# Patient Record
Sex: MALE | Race: WHITE | NOT HISPANIC OR LATINO | URBAN - METROPOLITAN AREA
[De-identification: names, ages, dates, MRNs, and addresses within clinical notes are randomized per-mention and may not be internally consistent; named-entity substitution may affect disease eponyms.]

---

## 2024-10-29 ENCOUNTER — APPOINTMENT (OUTPATIENT)
Dept: OTOLARYNGOLOGY | Facility: CLINIC | Age: 64
End: 2024-10-29
Payer: COMMERCIAL

## 2024-10-29 VITALS — WEIGHT: 188 LBS

## 2024-10-29 DIAGNOSIS — H91.90 HEARING LOSS, UNSPECIFIED HEARING LOSS TYPE, UNSPECIFIED LATERALITY: ICD-10-CM

## 2024-10-29 DIAGNOSIS — H93.13 TINNITUS OF BOTH EARS: ICD-10-CM

## 2024-10-29 DIAGNOSIS — Z86.69 HISTORY OF PERFORATION OF TYMPANIC MEMBRANE: Primary | ICD-10-CM

## 2024-10-29 PROCEDURE — 99203 OFFICE O/P NEW LOW 30 MIN: CPT | Performed by: GENERAL PRACTICE

## 2024-11-25 ENCOUNTER — APPOINTMENT (OUTPATIENT)
Dept: AUDIOLOGY | Facility: CLINIC | Age: 64
End: 2024-11-25
Payer: COMMERCIAL

## 2024-12-19 ENCOUNTER — APPOINTMENT (OUTPATIENT)
Dept: AUDIOLOGY | Facility: CLINIC | Age: 64
End: 2024-12-19
Payer: COMMERCIAL

## 2024-12-19 DIAGNOSIS — H93.11 TINNITUS OF RIGHT EAR: ICD-10-CM

## 2024-12-19 DIAGNOSIS — H90.3 SENSORINEURAL HEARING LOSS (SNHL) OF BOTH EARS: Primary | ICD-10-CM

## 2024-12-19 DIAGNOSIS — H90.3 ASYMMETRICAL SENSORINEURAL HEARING LOSS: ICD-10-CM

## 2024-12-19 DIAGNOSIS — H91.90 HEARING LOSS, UNSPECIFIED HEARING LOSS TYPE, UNSPECIFIED LATERALITY: ICD-10-CM

## 2024-12-19 DIAGNOSIS — H69.91 DYSFUNCTION OF RIGHT EUSTACHIAN TUBE: ICD-10-CM

## 2024-12-19 PROCEDURE — 92557 COMPREHENSIVE HEARING TEST: CPT | Performed by: AUDIOLOGIST

## 2024-12-19 PROCEDURE — 92550 TYMPANOMETRY & REFLEX THRESH: CPT | Performed by: AUDIOLOGIST

## 2024-12-19 NOTE — PROGRESS NOTES
AUDIOLOGY ADULT AUDIOMETRIC EVALUATION    Name:  Cliff Canales  :  1960  Age:  64 y.o.  Date of Evaluation:  2024    Reason for visit: Mr. Canales is seen in the clinic today at the request of otolaryngology for an audiologic evaluation.     HISTORY  Patient complains of long term right worse than left hearing loss and notices right tinnitus louder than left.  He denies dizziness and aural fullness.  He reports two sets of tubes about twenty years ago and has occupational noise exposure but wears HPD's in noise when possible.      EVALUATION  See scanned audiogram: “Media” > “Audiology Report”.      RESULTS  Otoscopic Evaluation:  Right Ear: clear ear canal  Left Ear: clear ear canal    Immittance Measures:  Tympanometry:  Right Ear:  Type Ad, hypercompliant tympanic membrane mobility with normal middle ear pressure   Left Ear: Type A, normal tympanic membrane mobility with normal middle ear pressure     Acoustic Reflexes:  Ipsilateral Right Ear:  NR NR NR NR ( .5-4 K HZ)  Ipsilateral Left Ear:     100 100 (  .5- 4 K HZ )  Contralateral Right Ear: did not evaluate  Contralateral Left Ear: did not evaluate    Distortion Product Otoacoustic Emissions (DPOAEs):  Right Ear:  PASS: NONE   REFER: 1-8 K HZ.  Left Ear:    PASS: 4 K HZ   REFER: 1, 1.5, 2,3,4,6,8 K HZ.    Audiometry:  Test Technique and Reliability: BEHAVIORAL  Standard audiometry via supra-aural headphones/INSERTS . Reliability is good.    Pure tone air and bone conduction audiometry:  Right Ear:  MOSTLY MILDTO 2 K HZ WITH A MODERATE TO SEVERE SNHL DROP AT 3 - 8 K HZ.  ASYMMETRY NOTED FROM LEFT EAR.  Left Ear: MOSTLY MILD SNHL 500- 8 K HZ.     Speech Audiometry (Word Recognition Scores):   Right Ear:  92% GOOD  Left Ear:    96% GOOD    IMPRESSIONS  ASYMMETRIC LONG TERM RIGHT SNHL WITH RIGHT WORSE THAN LEFT TINNITUS, LEFT MILD SNHL.  The presence of acoustic reflexes within normal intensity limits is consistent with normal middle ear  and brainstem function, and suggests that auditory sensitivity is not significantly impaired. An elevated or absent acoustic reflex threshold is consistent with a middle ear disorder, hearing loss in the stimulated ear, and/or interruption of neural innervation of the stapedius muscle. Present DPOAEs suggest normal/near normal cochlear outer hair cell function and are consistent with no greater than a mild hearing loss at those frequencies. Absent DPOAEs are consistent with abnormal cochlear outer hair cell function and some degree of hearing loss at those frequencies.    RECOMMENDATIONS  - Follow up with otolaryngology  as scheduled. Special tests as needed for asymmetry, right tinnitus.  - Audiologic evaluation as needed.  - Annual audiologic evaluation, sooner if an acute change is noted.  - Audiologic evaluation in conjunction with otologic care, if an acute change is noted, and/or annually.  - Consider hearing aids. Discussed , after medical consult if desires  to wear hearing aids. Contact insurance to determine if there is an applicable benefit and where it can be used. Contact our office to schedule an appointment should he wish to proceed with hearing aids through our clinic.  - Continue with Hearing Protection Devices in noise.  - Follow-up with medical care team as planned.    PATIENT EDUCATION  Discussed results, impressions and recommendations with the patient. Questions were addressed and the patient was encouraged to contact our office should concerns arise.    Time for this encounter: 60 minutes     Radha Pinon  Licensed Audiologist

## 2025-02-13 ENCOUNTER — OFFICE VISIT (OUTPATIENT)
Dept: PRIMARY CARE | Facility: CLINIC | Age: 65
End: 2025-02-13
Payer: COMMERCIAL

## 2025-02-13 VITALS
SYSTOLIC BLOOD PRESSURE: 160 MMHG | HEIGHT: 68 IN | OXYGEN SATURATION: 98 % | BODY MASS INDEX: 28.79 KG/M2 | DIASTOLIC BLOOD PRESSURE: 94 MMHG | HEART RATE: 101 BPM | WEIGHT: 190 LBS

## 2025-02-13 DIAGNOSIS — Z00.00 ROUTINE GENERAL MEDICAL EXAMINATION AT A HEALTH CARE FACILITY: ICD-10-CM

## 2025-02-13 DIAGNOSIS — R20.2 PARESTHESIA OF RIGHT ARM: ICD-10-CM

## 2025-02-13 DIAGNOSIS — Z12.12 ENCOUNTER FOR COLORECTAL CANCER SCREENING: ICD-10-CM

## 2025-02-13 DIAGNOSIS — Z12.11 ENCOUNTER FOR COLORECTAL CANCER SCREENING: ICD-10-CM

## 2025-02-13 DIAGNOSIS — I10 ESSENTIAL HYPERTENSION, BENIGN: Primary | ICD-10-CM

## 2025-02-13 DIAGNOSIS — Z23 IMMUNIZATION DUE: ICD-10-CM

## 2025-02-13 DIAGNOSIS — Z13.220 LIPID SCREENING: ICD-10-CM

## 2025-02-13 PROCEDURE — 3079F DIAST BP 80-89 MM HG: CPT | Performed by: STUDENT IN AN ORGANIZED HEALTH CARE EDUCATION/TRAINING PROGRAM

## 2025-02-13 PROCEDURE — 99386 PREV VISIT NEW AGE 40-64: CPT | Performed by: STUDENT IN AN ORGANIZED HEALTH CARE EDUCATION/TRAINING PROGRAM

## 2025-02-13 PROCEDURE — 90715 TDAP VACCINE 7 YRS/> IM: CPT | Performed by: STUDENT IN AN ORGANIZED HEALTH CARE EDUCATION/TRAINING PROGRAM

## 2025-02-13 PROCEDURE — 90471 IMMUNIZATION ADMIN: CPT | Performed by: STUDENT IN AN ORGANIZED HEALTH CARE EDUCATION/TRAINING PROGRAM

## 2025-02-13 PROCEDURE — 3077F SYST BP >= 140 MM HG: CPT | Performed by: STUDENT IN AN ORGANIZED HEALTH CARE EDUCATION/TRAINING PROGRAM

## 2025-02-13 PROCEDURE — 3008F BODY MASS INDEX DOCD: CPT | Performed by: STUDENT IN AN ORGANIZED HEALTH CARE EDUCATION/TRAINING PROGRAM

## 2025-02-13 RX ORDER — OLMESARTAN MEDOXOMIL 20 MG/1
20 TABLET ORAL DAILY
Qty: 30 TABLET | Refills: 1 | Status: SHIPPED | OUTPATIENT
Start: 2025-02-13 | End: 2025-04-14

## 2025-02-13 ASSESSMENT — ENCOUNTER SYMPTOMS
COUGH: 0
ARTHRALGIAS: 1
NAUSEA: 0
DIZZINESS: 0
CONSTIPATION: 0
NUMBNESS: 1
WHEEZING: 0
LIGHT-HEADEDNESS: 0
HEADACHES: 0
RHINORRHEA: 0
WOUND: 0
NERVOUS/ANXIOUS: 0
CHILLS: 0
DYSPHORIC MOOD: 0
FEVER: 0
FREQUENCY: 0
SINUS PAIN: 0
DYSURIA: 0
DIARRHEA: 0
SINUS PRESSURE: 0
PALPITATIONS: 0
FATIGUE: 0
POLYDIPSIA: 0
SLEEP DISTURBANCE: 0
MYALGIAS: 1
VOMITING: 0
SHORTNESS OF BREATH: 0

## 2025-02-13 ASSESSMENT — PATIENT HEALTH QUESTIONNAIRE - PHQ9
SUM OF ALL RESPONSES TO PHQ9 QUESTIONS 1 AND 2: 0
1. LITTLE INTEREST OR PLEASURE IN DOING THINGS: NOT AT ALL
2. FEELING DOWN, DEPRESSED OR HOPELESS: NOT AT ALL

## 2025-02-13 NOTE — PROGRESS NOTES
"Subjective   Patient ID: Cliff Canales is a 65 y.o. male who presents for Hypertension.    Here today to establish care.      Has known history of HTN.  Checked at work health fair, was in the 130's systolic.  Checking at home and in 150's.      Has had some right shoulder pain.  Will get some tingling and burning into the shoulder when driving long distances.    Patient is up-to-date on routine dental and vision care.  He does get regular physical activity.  States he does not exercise much in the winter due to most of his preferred activities being outdoors.        Review of Systems   Constitutional:  Negative for chills, fatigue and fever.   HENT:  Negative for congestion, hearing loss, rhinorrhea, sinus pressure, sinus pain and tinnitus.    Eyes:  Negative for visual disturbance.   Respiratory:  Negative for cough, shortness of breath and wheezing.    Cardiovascular:  Negative for chest pain, palpitations and leg swelling.   Gastrointestinal:  Negative for constipation, diarrhea, nausea and vomiting.   Endocrine: Negative for cold intolerance, heat intolerance, polydipsia and polyuria.   Genitourinary:  Negative for dysuria, frequency and urgency.   Musculoskeletal:  Positive for arthralgias and myalgias.   Skin:  Negative for pallor, rash and wound.   Neurological:  Positive for numbness. Negative for dizziness, light-headedness and headaches.   Psychiatric/Behavioral:  Negative for dysphoric mood and sleep disturbance. The patient is not nervous/anxious.        Objective     Visit Vitals  BP (!) 160/94   Pulse 101   Ht 1.727 m (5' 8\")   Wt 86.2 kg (190 lb)   SpO2 98%   BMI 28.89 kg/m²   Smoking Status Former   BSA 2.03 m²         Physical Exam  Constitutional:       Appearance: Normal appearance.   HENT:      Head: Normocephalic and atraumatic.      Right Ear: Tympanic membrane, ear canal and external ear normal.      Left Ear: Tympanic membrane, ear canal and external ear normal.      Nose: Nose normal.      " Mouth/Throat:      Mouth: Mucous membranes are moist.      Pharynx: Oropharynx is clear.   Eyes:      Extraocular Movements: Extraocular movements intact.      Conjunctiva/sclera: Conjunctivae normal.      Pupils: Pupils are equal, round, and reactive to light.   Cardiovascular:      Rate and Rhythm: Normal rate and regular rhythm.      Pulses: Normal pulses.      Heart sounds: Normal heart sounds.   Pulmonary:      Effort: Pulmonary effort is normal.      Breath sounds: Normal breath sounds.   Abdominal:      General: There is no distension.      Palpations: Abdomen is soft.      Tenderness: There is no abdominal tenderness.   Musculoskeletal:         General: Normal range of motion.   Skin:     General: Skin is warm and dry.   Neurological:      Mental Status: He is alert and oriented to person, place, and time. Mental status is at baseline.   Psychiatric:         Mood and Affect: Mood normal.         Behavior: Behavior normal.         Assessment & Plan  Essential hypertension, benign    Orders:    Comprehensive metabolic panel; Future    CBC; Future    Albumin-Creatinine Ratio, Urine Random; Future    olmesartan (BENIcar) 20 mg tablet; Take 1 tablet (20 mg) by mouth once daily.  Uncontrolled.  Not currently on any medication.  Previously took losartan without adverse effects.  To start olmesartan 20 mg daily.  If blood pressure remains elevated in 1 week, increase to 40 mg daily and contact the office for a refill at a higher dose.  Lipid screening    Orders:    Lipid panel; Future    Immunization due    Orders:    Tdap vaccine, age 7 years and older  (BOOSTRIX)    Encounter for colorectal cancer screening    Orders:    Colonoscopy Screening; Average Risk Patient; Future    Paresthesia of right arm    Orders:    XR shoulder right 2+ views; Future    XR cervical spine complete 4-5 views; Future  Findings consistent with impingement somewhere along C5/C6 distribution.  Unclear if this would be more centrally near  the spinal cord or representing a focal brachial plexopathy.  Will obtain plain films of the shoulder and C-spine to initiate workup.  Routine general medical examination at a health care facility       Due for colonoscopy.  Reports PSA was drawn recently at outside facility and he will provide results to office once available.  Patient has history of smoking for many years, however about 1/4 pack/day so does not qualify for lung screening.  Immunizations reviewed and updated today.  Reviewed basics of healthy diet and exercise.  Room to improve with regard to both.  Patient meets physical activity recommendations during the summer, but not during the winter.     Reviewed and approved by ROBY PROCTOR on 2/13/25 at 7:05 PM.

## 2025-02-13 NOTE — ASSESSMENT & PLAN NOTE
Orders:    Comprehensive metabolic panel; Future    CBC; Future    Albumin-Creatinine Ratio, Urine Random; Future    olmesartan (BENIcar) 20 mg tablet; Take 1 tablet (20 mg) by mouth once daily.  Uncontrolled.  Not currently on any medication.  Previously took losartan without adverse effects.  To start olmesartan 20 mg daily.  If blood pressure remains elevated in 1 week, increase to 40 mg daily and contact the office for a refill at a higher dose.

## 2025-02-13 NOTE — PATIENT INSTRUCTIONS
Start olmesartan 20 mg daily.  If blood pressure stays above 140 for the top number or above 90 for the bottom number, increase to 40 mg after 1 week and call the office so I can change your prescription.

## 2025-03-01 LAB
ALBUMIN SERPL-MCNC: 4.6 G/DL (ref 3.6–5.1)
ALBUMIN/CREAT UR: 9 MG/G CREAT
ALP SERPL-CCNC: 49 U/L (ref 35–144)
ALT SERPL-CCNC: 32 U/L (ref 9–46)
ANION GAP SERPL CALCULATED.4IONS-SCNC: 7 MMOL/L (CALC) (ref 7–17)
AST SERPL-CCNC: 20 U/L (ref 10–35)
BILIRUB SERPL-MCNC: 1.2 MG/DL (ref 0.2–1.2)
BUN SERPL-MCNC: 16 MG/DL (ref 7–25)
CALCIUM SERPL-MCNC: 9.5 MG/DL (ref 8.6–10.3)
CHLORIDE SERPL-SCNC: 104 MMOL/L (ref 98–110)
CHOLEST SERPL-MCNC: 216 MG/DL
CHOLEST/HDLC SERPL: 3.8 (CALC)
CO2 SERPL-SCNC: 30 MMOL/L (ref 20–32)
CREAT SERPL-MCNC: 0.87 MG/DL (ref 0.7–1.35)
CREAT UR-MCNC: 123 MG/DL (ref 20–320)
EGFRCR SERPLBLD CKD-EPI 2021: 96 ML/MIN/1.73M2
ERYTHROCYTE [DISTWIDTH] IN BLOOD BY AUTOMATED COUNT: 12.9 % (ref 11–15)
GLUCOSE SERPL-MCNC: 114 MG/DL (ref 65–99)
HCT VFR BLD AUTO: 47.4 % (ref 38.5–50)
HDLC SERPL-MCNC: 57 MG/DL
HGB BLD-MCNC: 15.3 G/DL (ref 13.2–17.1)
LDLC SERPL CALC-MCNC: 133 MG/DL (CALC)
MCH RBC QN AUTO: 30 PG (ref 27–33)
MCHC RBC AUTO-ENTMCNC: 32.3 G/DL (ref 32–36)
MCV RBC AUTO: 92.9 FL (ref 80–100)
MICROALBUMIN UR-MCNC: 1.1 MG/DL
NONHDLC SERPL-MCNC: 159 MG/DL (CALC)
PLATELET # BLD AUTO: 211 THOUSAND/UL (ref 140–400)
PMV BLD REES-ECKER: 12 FL (ref 7.5–12.5)
POTASSIUM SERPL-SCNC: 4.4 MMOL/L (ref 3.5–5.3)
PROT SERPL-MCNC: 6.9 G/DL (ref 6.1–8.1)
RBC # BLD AUTO: 5.1 MILLION/UL (ref 4.2–5.8)
SODIUM SERPL-SCNC: 141 MMOL/L (ref 135–146)
TRIGL SERPL-MCNC: 144 MG/DL
WBC # BLD AUTO: 5.7 THOUSAND/UL (ref 3.8–10.8)

## 2025-03-04 ENCOUNTER — PATIENT MESSAGE (OUTPATIENT)
Dept: PRIMARY CARE | Facility: CLINIC | Age: 65
End: 2025-03-04
Payer: COMMERCIAL

## 2025-03-04 DIAGNOSIS — I10 ESSENTIAL HYPERTENSION, BENIGN: ICD-10-CM

## 2025-03-04 RX ORDER — OLMESARTAN MEDOXOMIL 40 MG/1
40 TABLET ORAL DAILY
Qty: 90 TABLET | Refills: 0 | Status: SHIPPED | OUTPATIENT
Start: 2025-03-04 | End: 2025-06-02

## 2025-03-17 ENCOUNTER — APPOINTMENT (OUTPATIENT)
Dept: OTOLARYNGOLOGY | Facility: CLINIC | Age: 65
End: 2025-03-17
Payer: COMMERCIAL

## 2025-03-17 VITALS — HEIGHT: 68 IN | TEMPERATURE: 97.6 F | WEIGHT: 186 LBS | BODY MASS INDEX: 28.19 KG/M2

## 2025-03-17 DIAGNOSIS — Z00.00 HEALTH CARE MAINTENANCE: ICD-10-CM

## 2025-03-17 DIAGNOSIS — H90.3 BILATERAL SENSORINEURAL HEARING LOSS: ICD-10-CM

## 2025-03-17 DIAGNOSIS — H90.3 ASNHL (ASYMMETRICAL SENSORINEURAL HEARING LOSS): Primary | ICD-10-CM

## 2025-03-17 PROCEDURE — 3008F BODY MASS INDEX DOCD: CPT | Performed by: OTOLARYNGOLOGY

## 2025-03-17 PROCEDURE — 99213 OFFICE O/P EST LOW 20 MIN: CPT | Performed by: OTOLARYNGOLOGY

## 2025-03-17 NOTE — PROGRESS NOTES
"HPI  Cliff Canales is a 65 y.o. male previously evaluated by Dr. Barnard with right greater than left hearing loss.  He had an audiogram done in November which showed right greater than left sensorineural hearing loss particularly in the high frequencies.  Normal tympanometry bilaterally.  Denies otalgia and otorrhea.  He has bilateral tinnitus.  Dr. Barnard had discussed MRI with him but he has not followed up with him.  The patient denies noise exposure.  He has a history of tubes in his 40s.      History reviewed. No pertinent past medical history.         Medications:     Current Outpatient Medications:     olmesartan (BENIcar) 40 mg tablet, Take 1 tablet (40 mg) by mouth once daily., Disp: 90 tablet, Rfl: 0     Allergies:  No Known Allergies     Physical Exam:  Last Recorded Vitals  Temperature 36.4 °C (97.6 °F), height 1.727 m (5' 8\"), weight 84.4 kg (186 lb).  General:     General appearance: Well-developed, well-nourished in no acute distress.       Voice:  normal       Head/face: Normal appearance; nontender to palpation     Facial nerve function: Normal and symmetric bilaterally.    Oral/oropharynx:     Oral vestibule: Normal labial and gingival mucosa     Tongue/floor of mouth: Normal without lesion     Oropharynx: Clear.  No lesions present of the hard/soft palate, posterior pharynx    Neck:     Neck: Normal appearance, trachea midline     Salivary glands: Normal to palpation bilaterally     Lymph nodes: No cervical lymphadenopathy to palpation     Thyroid: No thyromegaly.  No palpable nodules     Range of motion: Normal    Neurological:     Cortical functions: Alert and oriented x3, appropriate affect       Larynx/hypopharynx:     Laryngeal findings: Mirror exam inadequate or limited secondary to enlarged base of tongue and/or excessive gagging    Ear:     Ear canal: Normal bilaterally     Tympanic membrane: Intact and mobile bilaterally.  Atrophic segments bilaterally.  Lateralized on the right.  " Retracted on the left.  No evidence of middle ear effusion     Pinna: Normal bilaterally     Hearing:  Gross hearing assessment normal by voice    Nose:     Visualized using: Anterior rhinoscopy     Nasopharynx: Inadequate mirror exam secondary to gag, anatomy.       Nasal dorsum: Nontraumatic midline appearance     Septum: Midline     Inferior turbinates: Normally sized     Mucosa: Bilateral, pink, normal appearing       ASSESSMENT/PLAN:  Asymmetric sensorineural hearing loss, right greater than left.  We talked about hearing aid evaluation on the right.  I agree with the previous recommendation of MRI and have ordered this for him.  He will call when it is done and recheck audiogram in a year, sooner as needed with any change.  Hearing protection discussed        Arnoldo Fall MD

## 2025-03-20 ENCOUNTER — HOSPITAL ENCOUNTER (OUTPATIENT)
Dept: RADIOLOGY | Facility: HOSPITAL | Age: 65
Discharge: HOME | End: 2025-03-20
Payer: COMMERCIAL

## 2025-03-20 DIAGNOSIS — R20.2 PARESTHESIA OF RIGHT ARM: ICD-10-CM

## 2025-03-20 PROCEDURE — 73030 X-RAY EXAM OF SHOULDER: CPT | Mod: RT

## 2025-03-20 PROCEDURE — 72050 X-RAY EXAM NECK SPINE 4/5VWS: CPT

## 2025-03-27 ENCOUNTER — TELEPHONE (OUTPATIENT)
Dept: PRIMARY CARE | Facility: CLINIC | Age: 65
End: 2025-03-27

## 2025-03-27 ENCOUNTER — APPOINTMENT (OUTPATIENT)
Dept: PRIMARY CARE | Facility: CLINIC | Age: 65
End: 2025-03-27
Payer: COMMERCIAL

## 2025-03-27 VITALS
WEIGHT: 187 LBS | BODY MASS INDEX: 28.43 KG/M2 | OXYGEN SATURATION: 99 % | HEART RATE: 86 BPM | TEMPERATURE: 98 F | SYSTOLIC BLOOD PRESSURE: 144 MMHG | DIASTOLIC BLOOD PRESSURE: 76 MMHG

## 2025-03-27 DIAGNOSIS — M79.671 RIGHT FOOT PAIN: ICD-10-CM

## 2025-03-27 DIAGNOSIS — I10 ESSENTIAL HYPERTENSION, BENIGN: ICD-10-CM

## 2025-03-27 DIAGNOSIS — I10 ESSENTIAL HYPERTENSION, BENIGN: Primary | ICD-10-CM

## 2025-03-27 PROCEDURE — 3077F SYST BP >= 140 MM HG: CPT | Performed by: STUDENT IN AN ORGANIZED HEALTH CARE EDUCATION/TRAINING PROGRAM

## 2025-03-27 PROCEDURE — 3078F DIAST BP <80 MM HG: CPT | Performed by: STUDENT IN AN ORGANIZED HEALTH CARE EDUCATION/TRAINING PROGRAM

## 2025-03-27 PROCEDURE — 99214 OFFICE O/P EST MOD 30 MIN: CPT | Performed by: STUDENT IN AN ORGANIZED HEALTH CARE EDUCATION/TRAINING PROGRAM

## 2025-03-27 RX ORDER — OLMESARTAN MEDOXOMIL AND HYDROCHLOROTHIAZIDE 40/12.5 40; 12.5 MG/1; MG/1
1 TABLET ORAL DAILY
Qty: 30 TABLET | Refills: 2 | Status: SHIPPED | OUTPATIENT
Start: 2025-03-27 | End: 2025-06-25

## 2025-03-27 ASSESSMENT — ENCOUNTER SYMPTOMS
HYPERTENSION: 1
HEADACHES: 0
NECK PAIN: 0
PND: 0
PALPITATIONS: 0
BLURRED VISION: 0
SWEATS: 0
ORTHOPNEA: 0
SHORTNESS OF BREATH: 0

## 2025-03-27 ASSESSMENT — PAIN SCALES - GENERAL: PAINLEVEL_OUTOF10: 0-NO PAIN

## 2025-03-27 NOTE — ASSESSMENT & PLAN NOTE
Orders:    olmesartan-hydrochlorothiazide (Benicar HCT) 40-12.5 mg tablet; Take 1 tablet by mouth once daily.  Improved but not controlled at this time.  Will add small dose of hydrochlorothiazide.  Pharmacy is only been dispensing 30 days at a time rather than 90, so should be okay to switch immediately to combo pill.  Patient is to follow-up in 6 weeks

## 2025-03-27 NOTE — PROGRESS NOTES
Subjective   Patient ID: Cliff Canales is a 65 y.o. male who presents for Hypertension.  Hypertension  This is a chronic problem. The current episode started more than 1 year ago. The problem has been waxing and waning since onset. The problem is resistant. Pertinent negatives include no anxiety, blurred vision, chest pain, headaches, malaise/fatigue, neck pain, orthopnea, palpitations, peripheral edema, PND, shortness of breath or sweats. Risk factors for coronary artery disease include dyslipidemia. Compliance problems include exercise.        Review of Systems   Constitutional:  Negative for malaise/fatigue.   Eyes:  Negative for blurred vision.   Respiratory:  Negative for shortness of breath.    Cardiovascular:  Negative for chest pain, palpitations, orthopnea and PND.   Musculoskeletal:  Positive for arthralgias. Negative for neck pain.   Neurological:  Negative for headaches.       Objective     Visit Vitals  /76   Pulse 86   Temp 36.7 °C (98 °F)   Wt 84.8 kg (187 lb)   SpO2 99%   BMI 28.43 kg/m²   Smoking Status Former   BSA 2.02 m²     BP Readings from Last 5 Encounters:   03/27/25 144/76   02/13/25 (!) 160/94         Physical Exam  Constitutional:       Appearance: Normal appearance.   HENT:      Head: Normocephalic and atraumatic.   Eyes:      Conjunctiva/sclera: Conjunctivae normal.   Cardiovascular:      Rate and Rhythm: Normal rate and regular rhythm.      Pulses: Normal pulses.      Heart sounds: Normal heart sounds.   Pulmonary:      Effort: Pulmonary effort is normal.      Breath sounds: Normal breath sounds.   Abdominal:      General: There is no distension.      Palpations: Abdomen is soft.      Tenderness: There is no abdominal tenderness.   Skin:     General: Skin is warm and dry.   Neurological:      Mental Status: He is alert and oriented to person, place, and time.   Psychiatric:         Mood and Affect: Mood normal.         Behavior: Behavior normal.         Assessment & Plan  Essential  hypertension, benign    Orders:    olmesartan-hydrochlorothiazide (Benicar HCT) 40-12.5 mg tablet; Take 1 tablet by mouth once daily.  Improved but not controlled at this time.  Will add small dose of hydrochlorothiazide.  Pharmacy is only been dispensing 30 days at a time rather than 90, so should be okay to switch immediately to combo pill.  Patient is to follow-up in 6 weeks  Right foot pain    Orders:    XR foot right 3+ views; Future  Patient with symptoms consistent with metatarsalgia.  Apart from tenderness there were no abnormal physical findings on examination of the foot.  Will obtain plain films to rule out bony injuries.  Treatment might include physical therapy, custom orthotics.     Reviewed and approved by ROBY PROCTOR on 3/28/25 at 7:59 AM.

## 2025-03-27 NOTE — TELEPHONE ENCOUNTER
FT - HTN follow-up - 5/28/2025  Please call patient if he needs blood work prior to this appointment.

## 2025-03-28 ENCOUNTER — APPOINTMENT (OUTPATIENT)
Dept: OTOLARYNGOLOGY | Facility: CLINIC | Age: 65
End: 2025-03-28
Payer: COMMERCIAL

## 2025-03-28 ASSESSMENT — ENCOUNTER SYMPTOMS: ARTHRALGIAS: 1

## 2025-04-03 ENCOUNTER — HOSPITAL ENCOUNTER (OUTPATIENT)
Dept: RADIOLOGY | Facility: HOSPITAL | Age: 65
Discharge: HOME | End: 2025-04-03
Payer: COMMERCIAL

## 2025-04-03 DIAGNOSIS — H90.3 ASNHL (ASYMMETRICAL SENSORINEURAL HEARING LOSS): ICD-10-CM

## 2025-04-03 PROCEDURE — 2550000001 HC RX 255 CONTRASTS: Performed by: OTOLARYNGOLOGY

## 2025-04-03 PROCEDURE — A9575 INJ GADOTERATE MEGLUMI 0.1ML: HCPCS | Performed by: OTOLARYNGOLOGY

## 2025-04-03 PROCEDURE — 70553 MRI BRAIN STEM W/O & W/DYE: CPT | Performed by: RADIOLOGY

## 2025-04-03 PROCEDURE — 70553 MRI BRAIN STEM W/O & W/DYE: CPT

## 2025-04-03 RX ORDER — GADOTERATE MEGLUMINE 376.9 MG/ML
20 INJECTION INTRAVENOUS
Status: COMPLETED | OUTPATIENT
Start: 2025-04-03 | End: 2025-04-03

## 2025-04-03 RX ADMIN — GADOTERATE MEGLUMINE 18 ML: 376.9 INJECTION INTRAVENOUS at 15:32

## 2025-04-28 DIAGNOSIS — I10 ESSENTIAL HYPERTENSION, BENIGN: ICD-10-CM

## 2025-05-23 LAB
ALBUMIN SERPL-MCNC: 4.7 G/DL (ref 3.6–5.1)
ALP SERPL-CCNC: 57 U/L (ref 35–144)
ALT SERPL-CCNC: 32 U/L (ref 9–46)
ANION GAP SERPL CALCULATED.4IONS-SCNC: 7 MMOL/L (CALC) (ref 7–17)
AST SERPL-CCNC: 22 U/L (ref 10–35)
BILIRUB SERPL-MCNC: 0.7 MG/DL (ref 0.2–1.2)
BUN SERPL-MCNC: 27 MG/DL (ref 7–25)
CALCIUM SERPL-MCNC: 9.4 MG/DL (ref 8.6–10.3)
CHLORIDE SERPL-SCNC: 103 MMOL/L (ref 98–110)
CO2 SERPL-SCNC: 29 MMOL/L (ref 20–32)
CREAT SERPL-MCNC: 1.14 MG/DL (ref 0.7–1.35)
EGFRCR SERPLBLD CKD-EPI 2021: 72 ML/MIN/1.73M2
GLUCOSE SERPL-MCNC: 92 MG/DL (ref 65–99)
POTASSIUM SERPL-SCNC: 4.2 MMOL/L (ref 3.5–5.3)
PROT SERPL-MCNC: 6.8 G/DL (ref 6.1–8.1)
SODIUM SERPL-SCNC: 139 MMOL/L (ref 135–146)

## 2025-05-27 ASSESSMENT — ENCOUNTER SYMPTOMS
HYPERTENSION: 1
SHORTNESS OF BREATH: 0
PND: 0
NECK PAIN: 0
ORTHOPNEA: 0
SWEATS: 0
BLURRED VISION: 0
PALPITATIONS: 0
HEADACHES: 0

## 2025-05-28 ENCOUNTER — APPOINTMENT (OUTPATIENT)
Dept: PRIMARY CARE | Facility: CLINIC | Age: 65
End: 2025-05-28
Payer: COMMERCIAL

## 2025-05-28 ENCOUNTER — TELEPHONE (OUTPATIENT)
Dept: PRIMARY CARE | Facility: CLINIC | Age: 65
End: 2025-05-28

## 2025-05-28 VITALS
HEART RATE: 88 BPM | WEIGHT: 190 LBS | BODY MASS INDEX: 28.79 KG/M2 | DIASTOLIC BLOOD PRESSURE: 76 MMHG | SYSTOLIC BLOOD PRESSURE: 128 MMHG | OXYGEN SATURATION: 97 % | HEIGHT: 68 IN

## 2025-05-28 DIAGNOSIS — I10 ESSENTIAL HYPERTENSION, BENIGN: ICD-10-CM

## 2025-05-28 DIAGNOSIS — Z12.5 ENCOUNTER FOR PROSTATE CANCER SCREENING: ICD-10-CM

## 2025-05-28 DIAGNOSIS — G57.11 MERALGIA PARESTHETICA OF RIGHT SIDE: Primary | ICD-10-CM

## 2025-05-28 PROCEDURE — 3074F SYST BP LT 130 MM HG: CPT | Performed by: STUDENT IN AN ORGANIZED HEALTH CARE EDUCATION/TRAINING PROGRAM

## 2025-05-28 PROCEDURE — 99213 OFFICE O/P EST LOW 20 MIN: CPT | Performed by: STUDENT IN AN ORGANIZED HEALTH CARE EDUCATION/TRAINING PROGRAM

## 2025-05-28 PROCEDURE — 3008F BODY MASS INDEX DOCD: CPT | Performed by: STUDENT IN AN ORGANIZED HEALTH CARE EDUCATION/TRAINING PROGRAM

## 2025-05-28 PROCEDURE — 3078F DIAST BP <80 MM HG: CPT | Performed by: STUDENT IN AN ORGANIZED HEALTH CARE EDUCATION/TRAINING PROGRAM

## 2025-05-28 RX ORDER — OLMESARTAN MEDOXOMIL AND HYDROCHLOROTHIAZIDE 40/12.5 40; 12.5 MG/1; MG/1
1 TABLET ORAL DAILY
Qty: 90 TABLET | Refills: 3 | Status: SHIPPED | OUTPATIENT
Start: 2025-05-28

## 2025-05-28 ASSESSMENT — PATIENT HEALTH QUESTIONNAIRE - PHQ9
1. LITTLE INTEREST OR PLEASURE IN DOING THINGS: NOT AT ALL
2. FEELING DOWN, DEPRESSED OR HOPELESS: NOT AT ALL
SUM OF ALL RESPONSES TO PHQ9 QUESTIONS 1 AND 2: 0

## 2025-05-28 ASSESSMENT — ENCOUNTER SYMPTOMS
PALPITATIONS: 0
PND: 0
SHORTNESS OF BREATH: 0
SWEATS: 0
HEADACHES: 0
HYPERTENSION: 1
BLURRED VISION: 0
ORTHOPNEA: 0
NECK PAIN: 0

## 2025-05-28 ASSESSMENT — COLUMBIA-SUICIDE SEVERITY RATING SCALE - C-SSRS: 1. IN THE PAST MONTH, HAVE YOU WISHED YOU WERE DEAD OR WISHED YOU COULD GO TO SLEEP AND NOT WAKE UP?: NO

## 2025-05-28 ASSESSMENT — PAIN SCALES - GENERAL: PAINLEVEL_OUTOF10: 0-NO PAIN

## 2025-05-28 NOTE — ASSESSMENT & PLAN NOTE
Orders:    olmesartan-hydrochlorothiazide (BENIcar HCT) 40-12.5 mg tablet; Take 1 tablet by mouth once daily.  Adequately controlled at this time.  To continue same management.  Patient does have blood pressure monitor at home and will continue to monitor.  If blood pressure becomes elevated prior to planned follow-up, patient will contact office for earlier appointment.

## 2025-05-28 NOTE — PROGRESS NOTES
"Subjective   Patient ID: Cliff Canales is a 65 y.o. male who presents for Hypertension.  Hypertension  This is a chronic problem. The current episode started more than 1 year ago. The problem has been waxing and waning since onset. The problem is resistant. Pertinent negatives include no anxiety, blurred vision, chest pain, headaches, malaise/fatigue, neck pain, orthopnea, palpitations, peripheral edema, PND, shortness of breath or sweats. There are no associated agents to hypertension. Risk factors for coronary artery disease include dyslipidemia and smoking/tobacco exposure. There are no compliance problems.        Review of Systems   Constitutional:  Negative for malaise/fatigue.   Eyes:  Negative for blurred vision.   Respiratory:  Negative for shortness of breath.    Cardiovascular:  Negative for chest pain, palpitations, orthopnea and PND.   Musculoskeletal:  Negative for neck pain.   Neurological:  Negative for headaches.       Objective     Visit Vitals  /76   Pulse 88   Ht 1.727 m (5' 8\")   Wt 86.2 kg (190 lb)   SpO2 97%   BMI 28.89 kg/m²   Smoking Status Former   BSA 2.03 m²     BP Readings from Last 5 Encounters:   05/28/25 128/76   03/27/25 144/76   02/13/25 (!) 160/94         Physical Exam  Constitutional:       Appearance: Normal appearance.   HENT:      Head: Normocephalic and atraumatic.      Right Ear: External ear normal.      Left Ear: External ear normal.   Eyes:      Conjunctiva/sclera: Conjunctivae normal.   Cardiovascular:      Rate and Rhythm: Normal rate and regular rhythm.      Heart sounds: Normal heart sounds.   Pulmonary:      Effort: Pulmonary effort is normal.      Breath sounds: Normal breath sounds.   Skin:     General: Skin is warm and dry.   Neurological:      Mental Status: He is alert and oriented to person, place, and time.   Psychiatric:         Mood and Affect: Mood normal.         Behavior: Behavior normal.         Assessment & Plan  Essential hypertension, " benign    Orders:    olmesartan-hydrochlorothiazide (BENIcar HCT) 40-12.5 mg tablet; Take 1 tablet by mouth once daily.  Adequately controlled at this time.  To continue same management.  Patient does have blood pressure monitor at home and will continue to monitor.  If blood pressure becomes elevated prior to planned follow-up, patient will contact office for earlier appointment.  Meralgia paresthetica of right side       Discussed pathogenesis of this condition, overall benign etiology.  Reassured patient.     Reviewed and approved by ROBY PROCTOR on 5/28/25 at 9:33 PM.

## 2026-02-16 ENCOUNTER — APPOINTMENT (OUTPATIENT)
Dept: PRIMARY CARE | Facility: CLINIC | Age: 66
End: 2026-02-16
Payer: COMMERCIAL